# Patient Record
(demographics unavailable — no encounter records)

---

## 2025-07-15 NOTE — HISTORY OF PRESENT ILLNESS
[de-identified] : CC: jaw pain   HISTORY OF PRESENT ILLNESS: CONNIE RODARTE is a 38 year old female who presents today with L sided jaw/facial pain x 6 weeks L upper wisdom tooth extracted 6 weeks ago. she was put on clindamycin now experiencing severe ear and jaw pain. she went to the ER 2 weeks ago and was given lorazepam and toradol, but is still experiencing pain and difficulty speaking  CT at [facility] reveals: sinuses well aerated  she saw Dr. iMke owusu that her CT was normal. she is here for a second opinion  she is an actress/voice actor   REVIEW OF SYSTEMS: General ROS: negative for - chills, fatigue or fever Psychological ROS: negative for - anxiety or depression Ophthalmic ROS: negative for - blurry vision, decreased vision or double vision ENT ROS: negative except as noted from HPI Allergy and Immunology ROS: negative except as noted from HPI Hematological and Lymphatic ROS: negative for - bleeding problems Endocrine ROS: negative for - malaise/lethargy Respiratory ROS: negative for - stridor Cardiovascular ROS: negative for - chest pain Gastrointestinal ROS: negative for - appetite loss or nausea/vomiting Genitourinary ROS: negative for - incontinence Musculoskeletal ROS: negative for - gait disturbance Neurological ROS: negative for - behavioral changes Dermatological ROS: negative for - nail changes   Physical Exam:   GENERAL APPEARANCE: Well-developed and No Acute Distress. COMMUNICATION: Able to Communicate. Strong Voice.   HEAD AND FACE Eyes: Testing of ocular motility including primary gaze alignment normal. Inspection and Appearance: No evidence of lesions or masses Palpation: Palpation of the face reveals no sinus tenderness Salivary Glands: Symmetric without masses Facial Strength: Symmetric without evidence of facial paralysis. slight L sided crepitus   EAR, NOSE, MOUTH, and THROAT: Ear Canals and Tympanic Membranes, Bilateral: No evidence of inflammation or lesions. Thresholds: Clinical speech reception thresholds normal. External, Nose and Auricle: No lesions or masses. mouth: extraction site well healed   NECK: Evaluation: No evidence of masses or crepitus. The neck is symmetric and the trachea is in the midline position. Thyroid: No evidence of enlargement, tenderness or mass. Neck Lymph Nodes: WNL. Respiratory: Inspection of the chest including symmetry, expansion and/or assessment of respiratory effort normal. Cardiovascular: Evaluation of peripheral vascular system by observation and palpation of capillary refill, normal. Neurological/Psychiatric: Alert, Oriented, Mood, and Affect Normal.     IMPRESSION: CONNIE RODARTE is a 38 year old female who presents today with L sided facial neuropathy, possible TMJ   PLAN:  - start pred 40 taper, prescribed - f/u with neurologist as scheduled, neuro will decide if she needs an MRI - RTC PRN       Eugenio Helm MD [facility] Rhinology and Skull Base Surgery Department of Otolaryngology- Head and Neck Surgery [facility]

## 2025-07-15 NOTE — HISTORY OF PRESENT ILLNESS
[de-identified] : CC: jaw pain   HISTORY OF PRESENT ILLNESS: CONNIE RODARTE is a 38 year old female who presents today with L sided jaw/facial pain x 6 weeks L upper wisdom tooth extracted 6 weeks ago. she was put on clindamycin now experiencing severe ear and jaw pain. she went to the ER 2 weeks ago and was given lorazepam and toradol, but is still experiencing pain and difficulty speaking  CT at [facility] reveals: sinuses well aerated  she saw Dr. Mike owusu that her CT was normal. she is here for a second opinion  she is an actress/voice actor   REVIEW OF SYSTEMS: General ROS: negative for - chills, fatigue or fever Psychological ROS: negative for - anxiety or depression Ophthalmic ROS: negative for - blurry vision, decreased vision or double vision ENT ROS: negative except as noted from HPI Allergy and Immunology ROS: negative except as noted from HPI Hematological and Lymphatic ROS: negative for - bleeding problems Endocrine ROS: negative for - malaise/lethargy Respiratory ROS: negative for - stridor Cardiovascular ROS: negative for - chest pain Gastrointestinal ROS: negative for - appetite loss or nausea/vomiting Genitourinary ROS: negative for - incontinence Musculoskeletal ROS: negative for - gait disturbance Neurological ROS: negative for - behavioral changes Dermatological ROS: negative for - nail changes   Physical Exam:   GENERAL APPEARANCE: Well-developed and No Acute Distress. COMMUNICATION: Able to Communicate. Strong Voice.   HEAD AND FACE Eyes: Testing of ocular motility including primary gaze alignment normal. Inspection and Appearance: No evidence of lesions or masses Palpation: Palpation of the face reveals no sinus tenderness Salivary Glands: Symmetric without masses Facial Strength: Symmetric without evidence of facial paralysis. slight L sided crepitus   EAR, NOSE, MOUTH, and THROAT: Ear Canals and Tympanic Membranes, Bilateral: No evidence of inflammation or lesions. Thresholds: Clinical speech reception thresholds normal. External, Nose and Auricle: No lesions or masses. mouth: extraction site well healed   NECK: Evaluation: No evidence of masses or crepitus. The neck is symmetric and the trachea is in the midline position. Thyroid: No evidence of enlargement, tenderness or mass. Neck Lymph Nodes: WNL. Respiratory: Inspection of the chest including symmetry, expansion and/or assessment of respiratory effort normal. Cardiovascular: Evaluation of peripheral vascular system by observation and palpation of capillary refill, normal. Neurological/Psychiatric: Alert, Oriented, Mood, and Affect Normal.     IMPRESSION: CONNIE RODARTE is a 38 year old female who presents today with L sided facial neuropathy, possible TMJ   PLAN:  - start pred 40 taper, prescribed - f/u with neurologist as scheduled, neuro will decide if she needs an MRI - RTC PRN       Eugenio Helm MD [facility] Rhinology and Skull Base Surgery Department of Otolaryngology- Head and Neck Surgery [facility]

## 2025-07-28 NOTE — ASSESSMENT
[FreeTextEntry1] : Patient comes in today after injuring her right long finger.  She complains of pain in the palm.  She says that on 6/22 she was lifting herself up from yoga blocks and hyperextended the long finger.  She has pain by the MP joint.  She says it has been getting better however she recently lifted a 10 pound kettle bell which caused her more pain.  She says the finger just does not feel right but she is able to use it.  It has been about 5 weeks from the injury.  She has seen somebody else for the finger but is here now for a second opinion.  Right hand: No gross deformities visualized, slightly tender palpation over A1 pulley right long finger, nontender palpation over any other pulleys of the finger, nontender ovation along the RCL UCL of the MP joint, no laxity with stressing of both, full range of motion of the fingers, neurovascular intact  X-rays of the right long finger 3 views are negative Ultrasound performed of the right long finger show normal flexor tendon without any increased distance between the bone indicating pulley injury, did not show any excessive joint fluid at the MP joint, do not show any abnormalities dorsally  The patient was advised of the diagnosis. The natural history of the pathology was explained in full to the patient in layman's terms. We discussed that the patient would be better off with early protected range of motion.  Splinting leads to a high incidence of stiffness.  I recommend buddy strapping which she is currently doing as needed and early range of motion exercises.  If motion is near normal the patient can continue with a home exercise program.  If the patient is having a lot of difficulty with self directed exercises, therapy may be recommended.  However at this point the patient is doing well on her own I do not believe it is necessary.  All questions were answered.  I am recommending letting pain be her guide in regards to what she should and should not do as it has been 5 weeks.  I believe it will take time to improve.  If in about 4 to 6 weeks she is not improving at all, I will recommend an MRI.  At this point I believe time will make the difference for her.